# Patient Record
Sex: MALE | Race: BLACK OR AFRICAN AMERICAN | NOT HISPANIC OR LATINO
[De-identification: names, ages, dates, MRNs, and addresses within clinical notes are randomized per-mention and may not be internally consistent; named-entity substitution may affect disease eponyms.]

---

## 2020-04-27 ENCOUNTER — TRANSCRIPTION ENCOUNTER (OUTPATIENT)
Age: 25
End: 2020-04-27

## 2021-08-19 ENCOUNTER — TRANSCRIPTION ENCOUNTER (OUTPATIENT)
Age: 26
End: 2021-08-19

## 2021-10-30 ENCOUNTER — TRANSCRIPTION ENCOUNTER (OUTPATIENT)
Age: 26
End: 2021-10-30

## 2023-05-02 PROBLEM — Z00.00 ENCOUNTER FOR PREVENTIVE HEALTH EXAMINATION: Status: ACTIVE | Noted: 2023-05-02

## 2024-04-20 ENCOUNTER — APPOINTMENT (OUTPATIENT)
Dept: ORTHOPEDIC SURGERY | Facility: CLINIC | Age: 29
End: 2024-04-20
Payer: COMMERCIAL

## 2024-04-20 VITALS — HEIGHT: 66 IN

## 2024-04-20 DIAGNOSIS — J45.909 UNSPECIFIED ASTHMA, UNCOMPLICATED: ICD-10-CM

## 2024-04-20 DIAGNOSIS — M75.42 IMPINGEMENT SYNDROME OF LEFT SHOULDER: ICD-10-CM

## 2024-04-20 PROCEDURE — ZZZZZ: CPT

## 2024-04-20 RX ORDER — NAPROXEN 500 MG/1
500 TABLET ORAL
Qty: 60 | Refills: 0 | Status: ACTIVE | COMMUNITY
Start: 2024-04-20 | End: 1900-01-01

## 2024-04-20 NOTE — CONSULT LETTER
[Dear  ___] : Dear  [unfilled], [Consult Letter:] : I had the pleasure of evaluating your patient, [unfilled]. [Please see my note below.] : Please see my note below. [Consult Closing:] : Thank you very much for allowing me to participate in the care of this patient.  If you have any questions, please do not hesitate to contact me. [Sincerely,] : Sincerely, [FreeTextEntry3] : Dusty Mcmahon M.D. Shoulder Surgery

## 2024-04-20 NOTE — PHYSICAL EXAM
[Left] : left shoulder [Sitting] : sitting [Moderate] : moderate [Mild] : mild [5 ___] : forward flexion 5[unfilled]/5 [5___] : external rotation 5[unfilled]/5 [] : no sensory deficits [Right] : right shoulder [FreeTextEntry9] : IR to T12. [de-identified] : Impingement is mild. Bellypress is negative.  [FreeTextEntry3] : 170/50/L1. [TWNoteComboBox4] : passive forward flexion 165 degrees [TWNoteComboBox6] : False [de-identified] : external rotation 30 degrees

## 2024-04-20 NOTE — IMAGING
[Left] : left shoulder [FreeTextEntry1] : The joints are ok.  [FreeTextEntry5] : There is a type III acromion.

## 2024-04-20 NOTE — ASSESSMENT
[FreeTextEntry1] : We discussed the underlying pathology.  Treatment options reviewed.  Naprosyn is prescribed.  PT is planned.  If symptoms persist, an injection is an option.  Cautions discussed.  Questions answered.  Patient seen by Dusty Mcmahon M.D. Entered by Lakshmi Carney acting as scribe.

## 2024-06-03 ENCOUNTER — APPOINTMENT (OUTPATIENT)
Dept: ORTHOPEDIC SURGERY | Facility: CLINIC | Age: 29
End: 2024-06-03

## 2024-07-17 ENCOUNTER — APPOINTMENT (OUTPATIENT)
Dept: ORTHOPEDIC SURGERY | Facility: CLINIC | Age: 29
End: 2024-07-17

## 2024-09-13 ENCOUNTER — APPOINTMENT (OUTPATIENT)
Dept: ORTHOPEDIC SURGERY | Facility: CLINIC | Age: 29
End: 2024-09-13
Payer: COMMERCIAL

## 2024-09-13 DIAGNOSIS — M75.42 IMPINGEMENT SYNDROME OF LEFT SHOULDER: ICD-10-CM

## 2024-09-13 PROCEDURE — 99214 OFFICE O/P EST MOD 30 MIN: CPT

## 2024-09-13 NOTE — REASON FOR VISIT
[FreeTextEntry2] : This is a 29 year old RHD male in  with left shoulder pain since January 2024 without specific injury. He had been exercising. No prior history. Freestyle swimming aggravates the shoulder. He mostly weight lifts which hurts his shoulder more. Reaching is painful. Night symptoms can occur. There is no n/t.  Naproxen hasn't helped.  He goes to PT.  The pain persists, with more cracking now.

## 2024-09-13 NOTE — PHYSICAL EXAM
[Left] : left shoulder [Sitting] : sitting [Moderate] : moderate [Mild] : mild [5 ___] : forward flexion 5[unfilled]/5 [5___] : external rotation 5[unfilled]/5 [Right] : right shoulder [] : no sensory deficits [FreeTextEntry9] : IR to T12. [de-identified] : Impingement is mild. Bellypress is negative.  [FreeTextEntry3] : 170/50/L1. [TWNoteComboBox4] : passive forward flexion 165 degrees [de-identified] : external rotation 30 degrees

## 2024-09-13 NOTE — HISTORY OF PRESENT ILLNESS
[de-identified] : Follow up L Shoulder. Feel cracking in shoulder more since last visit. Weakness in left hand. Needs new PT script. Tried Naproxyn, hasn't helped.

## 2024-09-13 NOTE — ASSESSMENT
[FreeTextEntry1] : We reviewed his options. MRI Left shoulder prescribed. Based on those results, we will discuss future options. Injections and PT discussed for the future. Aleve 440 discussed.   Patient was seen by Dr. Dusty Mcmahon. Patient was seen by Deepali LEON under the supervision of Dr. Dusty Mcmahon. Progress note was completed by Deepali LEON.  As the PA, Deepali Moreno performed and entered portions of the history, physical exam, imaging, diagnosis, and plan.

## 2024-09-25 ENCOUNTER — APPOINTMENT (OUTPATIENT)
Dept: MRI IMAGING | Facility: CLINIC | Age: 29
End: 2024-09-25
Payer: COMMERCIAL

## 2024-09-25 PROCEDURE — 73221 MRI JOINT UPR EXTREM W/O DYE: CPT | Mod: LT

## 2024-10-04 ENCOUNTER — APPOINTMENT (OUTPATIENT)
Dept: ORTHOPEDIC SURGERY | Facility: CLINIC | Age: 29
End: 2024-10-04
Payer: COMMERCIAL

## 2024-10-04 DIAGNOSIS — M75.42 IMPINGEMENT SYNDROME OF LEFT SHOULDER: ICD-10-CM

## 2024-10-04 PROCEDURE — 99214 OFFICE O/P EST MOD 30 MIN: CPT

## 2024-10-04 NOTE — ASSESSMENT
[FreeTextEntry1] : We reviewed his options. Discussed L SH CSI and PT. His schedule is busy so he will reschedule CSI. Aleve 440 discussed.  Patient seen by Dr. Dusty Mcmahon, who determined the assessment and plan. Deepali Landers PAC was present for and participated in aspects of the office encounter. IRae, am scribing for Dr. Dusty Mcmahon in his presence for the chief complaint, physical exam, studies, assessment, and/or plan.

## 2024-10-04 NOTE — PHYSICAL EXAM
[Left] : left shoulder [Sitting] : sitting [Moderate] : moderate [Mild] : mild [5 ___] : forward flexion 5[unfilled]/5 [5___] : external rotation 5[unfilled]/5 [Right] : right shoulder [] : no sensory deficits [FreeTextEntry9] : IR to T12. [de-identified] : Impingement is mild. Bellypress is negative.  [FreeTextEntry3] : 170/50/L1. [TWNoteComboBox4] : passive forward flexion 165 degrees [de-identified] : external rotation 30 degrees

## 2024-10-04 NOTE — DATA REVIEWED
[FreeTextEntry1] : MRI L SHOULDER [facility] 9/25/24: I reviewed the images and my interpretation is that there are minor AC changes. The biceps and GH joint are OK. No major cuff tear is present. There is a type II-III acromion.  XR: The joints are ok.  There is a type III acromion.

## 2024-10-04 NOTE — REASON FOR VISIT
[FreeTextEntry2] : This is a 29 year old RHD male in  with left shoulder pain since January 2024 without specific injury. He had been exercising. No prior history. Freestyle swimming aggravates the shoulder. He mostly weight lifts which hurts his shoulder more. Reaching is painful. Night symptoms can occur. There is no n/t.  Naproxen has helped slightly.  He finished to PT.  The pain persists, with more cracking now.  The L  MRI was done.

## 2024-10-11 ENCOUNTER — APPOINTMENT (OUTPATIENT)
Dept: ORTHOPEDIC SURGERY | Facility: CLINIC | Age: 29
End: 2024-10-11

## 2024-10-14 ENCOUNTER — APPOINTMENT (OUTPATIENT)
Dept: ORTHOPEDIC SURGERY | Facility: CLINIC | Age: 29
End: 2024-10-14
Payer: COMMERCIAL

## 2024-10-14 VITALS — WEIGHT: 224 LBS | HEIGHT: 66 IN | BODY MASS INDEX: 36 KG/M2

## 2024-10-14 DIAGNOSIS — M75.42 IMPINGEMENT SYNDROME OF LEFT SHOULDER: ICD-10-CM

## 2024-10-14 PROCEDURE — 20611 DRAIN/INJ JOINT/BURSA W/US: CPT | Mod: LT

## 2024-10-14 NOTE — HISTORY OF PRESENT ILLNESS
[Full time] : Work status: full time [de-identified] : Follow up Left shoulder, came in to have CSI [] : Post Surgical Visit: no [de-identified] : SS

## 2024-10-14 NOTE — ASSESSMENT
[FreeTextEntry1] : We reviewed his options. The injection was outlined. PT will restart. Questions answered.  Patient seen by Dr. Dylan Mcgill, who determined the assessment and plan. Yesenia BORJA was present for and participated in aspects of the office encounter. I, Marlene Ramsey, am scribing for Dr. Dylan Mcgill in his presence for the chief complaint, physical exam, studies, assessment, and/or plan.  Procedure Name: Large Joint Injection / Aspiration: Depomedrol, Lidocaine and Guidance Ultrasound   Large Joint Injection was performed because of pain and inflammation. Depomedrol: An injection of Depomedrol 40 mg , 2 cc. Lidocaine: An injection of Lidocaine 1 mg , 13 cc.   Medication was injected in the left subacromial space from a posterior approach. Patient has tried OTC's including aspirin, Ibuprofen, Aleve etc or prescription NSAIDS, and/or exercises at home and/ or physical therapy without satisfactory response. The risks, benefits, and alternatives to steroid injection were explained in full to the patient. Risks outlined include but are not limited to infection, sepsis, bleeding, scarring, skin discoloration, temporary increase in pain, syncopal episode, failure to resolve symptoms, allergic reaction, symptom recurrence, and elevation of blood sugar in diabetics. Patient understood the risks. All questions were answered. After discussion, patient requested an injection. Oral informed consent was obtained.  Sterile preparation with betadine and aseptic technique was utilized for the procedure, including the preparation of the solutions used for the injection. Patient tolerated the procedure well.  Post Procedure Instructions: Patient was advised to call if redness, pain, or fever occur and apply ice for 15 min. out of every hour for the next 12-24 hours as tolerated. Patient was advised to rest the joint(s) for 3 days.  Advised to ice the injection site this evening. Ultrasound Guidance was used for the following reasons: for precise injection in area of tear. Visualization of the needle and placement of injection was performed without complication.

## 2024-10-14 NOTE — REASON FOR VISIT
[FreeTextEntry2] : This is a 29 year old RHD male in  with left shoulder pain since January 2024 without specific injury. He had been exercising. No prior history. Freestyle swimming aggravates the shoulder. He mostly weight lifts which hurts his shoulder more. Reaching is painful. Night symptoms can occur. There is no n/t.  Naproxen has helped slightly.  He finished to PT.  The pain persists, with more cracking now.  The L SH MRI was done.  He returns today for his injection.

## 2024-10-14 NOTE — PHYSICAL EXAM
[Left] : left shoulder [Sitting] : sitting [Moderate] : moderate [Mild] : mild [5 ___] : forward flexion 5[unfilled]/5 [5___] : external rotation 5[unfilled]/5 [Right] : right shoulder [] : no sensory deficits [FreeTextEntry9] : IR to T12. [de-identified] : Impingement is mild. Bellypress is negative.  [FreeTextEntry3] : 170/50/L1. [TWNoteComboBox4] : passive forward flexion 165 degrees [de-identified] : external rotation 30 degrees

## 2024-12-02 ENCOUNTER — APPOINTMENT (OUTPATIENT)
Dept: ORTHOPEDIC SURGERY | Facility: CLINIC | Age: 29
End: 2024-12-02